# Patient Record
Sex: FEMALE | Race: WHITE | URBAN - METROPOLITAN AREA
[De-identification: names, ages, dates, MRNs, and addresses within clinical notes are randomized per-mention and may not be internally consistent; named-entity substitution may affect disease eponyms.]

---

## 2017-09-06 ENCOUNTER — THERAPY VISIT (OUTPATIENT)
Dept: PHYSICAL THERAPY | Facility: CLINIC | Age: 77
End: 2017-09-06
Payer: MEDICARE

## 2017-09-06 DIAGNOSIS — M25.532 LEFT WRIST PAIN: Primary | ICD-10-CM

## 2017-09-06 DIAGNOSIS — S62.309A CLOSED FRACTURE OF METACARPAL BONE: ICD-10-CM

## 2017-09-06 DIAGNOSIS — S52.122A LEFT RADIAL HEAD FRACTURE: ICD-10-CM

## 2017-09-06 PROCEDURE — G8984 CARRY CURRENT STATUS: HCPCS | Mod: GP | Performed by: PHYSICAL THERAPIST

## 2017-09-06 PROCEDURE — 97161 PT EVAL LOW COMPLEX 20 MIN: CPT | Mod: GP | Performed by: PHYSICAL THERAPIST

## 2017-09-06 PROCEDURE — G8985 CARRY GOAL STATUS: HCPCS | Mod: GP | Performed by: PHYSICAL THERAPIST

## 2017-09-06 PROCEDURE — 97110 THERAPEUTIC EXERCISES: CPT | Mod: GP | Performed by: PHYSICAL THERAPIST

## 2017-09-06 NOTE — PROGRESS NOTES
[facility] Initial Evaluation      Subjective:    Patient is a 77 year old female presenting with rehab left wrist hpi. The history is provided by the patient.   Consuelo Irving is a 77 year old female with a left wrist (elbow) condition.  Condition occurred with:  A fall.  Condition occurred: in the community.  This is a new condition  Reports tripping on sidewalk, falling face forward landing on her L side on 7/27/17. She fractured her L 5th metacarpal and L radial head and was casted for 4 weeks in a sling. The cast was removed on 9/5/17 and pt noted limited ROM/soreness in wrist and hand.MD x-rayed elbow/wrist/hand and it showed healing.MD advised PT on 9/5/17 .    Patient reports pain:  Other (wrist/hand).    Pain is described as aching and other (stiffness) and is intermittent and reported as 5/10.  Associated symptoms:  Loss of motion/stiffness and loss of strength. Pain is the same all the time.  Symptoms are exacerbated by activity/movement, carrying and lifting and relieved by rest.  Since onset symptoms are unchanged.  Special tests:  X-ray and other (showed healing at fx sites).      General health as reported by patient is good.  Pertinent medical history includes:  None.  Medical allergies: yes (marilou).  Other surgeries include:  None reported.  Current medications:  Other (cholesterol).  Current occupation is retired.            Red flags:  None as reported by the patient.                        Objective:          Flexibility/Screens:     Upper Extremity:    Decreased left upper extremity flexibility at:  Wrist Flexors and Wrist Extensors                                  ROM:  AROM:    Hyperextension Elbow: Left:  0  Right:  4  Flexion Elbow:  Left:100   Right:145  Extension Elbow:  Left: 35 from neutral    Right: 0  Flexion Wrist:  Left: 28    Right: 60  Extension Wrist:  Left: 2    Right:  60  Supination Elbow/Wrist:  Left: 45Right: 85  Pronation Elbow/Wrist:  Left: 40    Right:  85  Radial Deviation:  Left: 4   Right: 28  Ulnar Deviation:  Right: 40    Left: 10  PROM:      Flexion Elbow:  Left: 105  Extension Elbow: Left: 30 from neutral position  Flexion Wrist:  Left: 30      Extension Wrist:  Left: 2  Supination Elbow/Wrist: Left: 45        Pronation Elbow/Wrist: Left: 40          Pain: discomfort at lateral/dorsal aspect of wrist with all A-PROM    Strength:  not assessed                        Palpation:    Left wrist/elbow tenderness present at: Proximal Carpals and Distal Carpals    Mobility:  not assessed                                          General     ROS    Assessment/Plan:      Patient is a 77 year old female with left side elbow and left side wrist/hand complaints.    Patient has the following significant findings with corresponding treatment plan.                Diagnosis 1:  5th metacarpal fracture, radial head fx, wrist pain  Pain -  hot/cold therapy, manual therapy, self management, education and home program  Decreased ROM/flexibility - manual therapy, therapeutic exercise and home program  Decreased joint mobility - manual therapy, therapeutic exercise and home program  Decreased strength - therapeutic exercise, therapeutic activities and home program  Edema - cold therapy and self management/home program  Impaired muscle performance - neuro re-education and home program  Decreased function - therapeutic activities and home program    Therapy Evaluation Codes:   1) History comprised of:   Personal factors that impact the plan of care:      None.    Comorbidity factors that impact the plan of care are:      None.     Medications impacting care: None.  2) Examination of Body Systems comprised of:   Body structures and functions that impact the plan of care:      Elbow, Hand and Wrist.   Activity limitations that impact the plan of care are:      Bathing, Cooking, Driving, Dressing, Grasping and Lifting.  3) Clinical presentation characteristics  are:   Stable/Uncomplicated.  4) Decision-Making    Low complexity using standardized patient assessment instrument and/or measureable assessment of functional outcome.  Cumulative Therapy Evaluation is: Low complexity.    Previous and current functional limitations:  (See Goal Flow Sheet for this information)    Short term and Long term goals: (See Goal Flow Sheet for this information)     Communication ability:  Patient appears to be able to clearly communicate and understand verbal and written communication and follow directions correctly.  Treatment Explanation - The following has been discussed with the patient:   RX ordered/plan of care  Anticipated outcomes  Possible risks and side effects  This patient would benefit from PT intervention to resume normal activities.   Rehab potential is good.    Frequency:  1 X week, once daily  Duration:  for 8 weeks  Discharge Plan:  Achieve all LTG.  Independent in home treatment program.  Reach maximal therapeutic benefit.    Please refer to the daily flowsheet for treatment today, total treatment time and time spent performing 1:1 timed codes.

## 2017-09-06 NOTE — LETTER
DEPARTMENT OF HEALTH AND HUMAN SERVICES  CENTERS FOR MEDICARE & MEDICAID SERVICES    PLAN/UPDATED PLAN OF PROGRESS FOR OUTPATIENT REHABILITATION    PATIENTS NAME:  Consuelo Irving   : 1940  PR[facility] NUMBER:    [id number]  [id number]   PROVIDER NAME: [facility]  MEDICAL RECORD NUMBER: [id number]   START OF CARE DATE:  SOC Date: 17   TYPE:  PT    PRIMARY/TREATMENT DIAGNOSIS: (Pertinent Medical Diagnosis)     Left wrist pain  Left radial head fracture  Closed fracture of metacarpal bone    VISITS FROM START OF CARE:  Rxs Used: 1   [facility] Initial Evaluation  Subjective:  Patient is a 77 year old female presenting with rehab left wrist hpi. The history is provided by the patient.   Consuelo Irving is a 77 year old female with a left wrist (elbow) condition.  Condition occurred with:  A fall.  Condition occurred: in the community.  This is a new condition  Reports tripping on sidewalk, falling face forward landing on her L side on 17. She fractured her L 5th metacarpal and L radial head and was casted for 4 weeks in a sling. The cast was removed on 17 and pt noted limited ROM/soreness in wrist and hand.MD x-rayed elbow/wrist/hand and it showed healing.MD advised PT on 17 .    Patient reports pain:  Other (wrist/hand).    Pain is described as aching and other (stiffness) and is intermittent and reported as 5/10.  Associated symptoms:  Loss of motion/stiffness and loss of strength. Pain is the same all the time.  Symptoms are exacerbated by activity/movement, carrying and lifting and relieved by rest.  Since onset symptoms are unchanged.  Special tests:  X-ray and other (showed healing at fx sites).    General health as reported by patient is good.  Pertinent medical history includes:  None.  Medical allergies: yes (marilou).  Other surgeries include:  None reported.  Current medications:  Other (cholesterol).  Current  occupation is retired.      Red flags:  None as reported by the patient.       Objective:  Flexibility/Screens:   Upper Extremity:    Decreased left upper extremity flexibility at:  Wrist Flexors and Wrist Extensors  ROM:  AROM:    Hyperextension Elbow: Left:  0  Right:  4  Flexion Elbow:  Left:100   Right:145  Extension Elbow:  Left: 35 from neutral    Right: 0  Flexion Wrist:  Left: 28    Right: 60  Extension Wrist:  Left: 2    Right:  60  Supination Elbow/Wrist:  Left: 45Right: 85  Pronation Elbow/Wrist:  Left: 40    Right: 85  Radial Deviation:  Left: 4   Right: 28  Ulnar Deviation:  Right: 40    Left: 10  PROM:    Flexion Elbow:  Left: 105  Extension Elbow: Left: 30 from neutral position  Flexion Wrist:  Left: 30      Extension Wrist:  Left: 2  Supination Elbow/Wrist: Left: 45        Pronation Elbow/Wrist: Left: 40      Pain: discomfort at lateral/dorsal aspect of wrist with all A-PROM  Strength:  not assessed  Palpation:    Left wrist/elbow tenderness present at: Proximal Carpals and Distal Carpals  Mobility:  not assessed  PATIENTS NAME:  Consuelo Irving   : 1940    Assessment/Plan:    Patient is a 77 year old female with left side elbow and left side wrist/hand complaints.    Patient has the following significant findings with corresponding treatment plan.                Diagnosis 1:  5th metacarpal fracture, radial head fx, wrist pain  Pain -  hot/cold therapy, manual therapy, self management, education and home program  Decreased ROM/flexibility - manual therapy, therapeutic exercise and home program  Decreased joint mobility - manual therapy, therapeutic exercise and home program  Decreased strength - therapeutic exercise, therapeutic activities and home program  Edema - cold therapy and self management/home program  Impaired muscle performance - neuro re-education and home program  Decreased function - therapeutic activities and home program    Therapy Evaluation Codes:   1) History comprised  "of:   Personal factors that impact the plan of care:      None.    Comorbidity factors that impact the plan of care are:      None.     Medications impacting care: None.  2) Examination of Body Systems comprised of:   Body structures and functions that impact the plan of care:      Elbow, Hand and Wrist.   Activity limitations that impact the plan of care are:      Bathing, Cooking, Driving, Dressing, Grasping and Lifting.  3) Clinical presentation characteristics are:   Stable/Uncomplicated.  4) Decision-Making    Low complexity using standardized patient assessment instrument and/or measureable assessment of functional outcome.  Cumulative Therapy Evaluation is: Low complexity.    Previous and current functional limitations:  (See Goal Flow Sheet for this information)    Short term and Long term goals: (See Goal Flow Sheet for this information)     Communication ability:  Patient appears to be able to clearly communicate and understand verbal and written communication and follow directions correctly.  Treatment Explanation - The following has been discussed with the patient:     RX ordered/plan of care  Anticipated outcomes  Possible risks and side effects  This patient would benefit from PT intervention to resume normal activities.   Rehab potential is good.    Frequency:  1 X week, once daily  Duration:  for 8 weeks  Discharge Plan:  Achieve all LTG.  Independent in home treatment program.  Reach maximal therapeutic benefit.      Caregiver Signature/Credentials _____________________________ Date ________      Treating Provider: Marquita Zepeda PT   I have reviewed and certified the need for these services and plan of treatment while under my care.      PHYSICIAN'S SIGNATURE:   _________________________________________  Date___________     Anderson Morales  Certification period:  Beginning of Cert date period: 09/06/17 to  End of Cert period date: 12/04/17     Functional Level Progress Report: Please see attached \"Goal " "Flow sheet for Functional level.\"    ____X____ Continue Services or       ________ DC Services            Service dates: From  SOC Date: 09/06/17 date to present                         "

## 2017-09-06 NOTE — MR AVS SNAPSHOT
After Visit Summary   9/6/2017    Consuelo Irving    MRN: [id number]           Patient Information     Date Of Birth          1940        Visit Information        Provider Department      9/6/2017 12:40 PM Marquita Zepeda, PT [facility] Physical Therapy        Today's Diagnoses     Left wrist pain    -  1    Left radial head fracture        Closed fracture of metacarpal bone           Follow-ups after your visit        Your next 10 appointments already scheduled     Sep 08, 2017 10:50 AM CDT   MILTON Extremity with Marquita Zepeda PT   [facility] Physical Therapy ([facility])    [address]  [facility] [address]   [phone]            Sep 13, 2017 10:10 AM CDT   MILTON Extremity with Marquita Zepeda PT   [facility] ([facility])    [address]  [facility] [id number] [phone]              Who to contact     If you have questions or need follow up information about today's clinic visit or your schedule please contact [facility] PHYSICAL THERAPY directly at [phone].  Normal or non-critical lab and imaging results will be communicated to you by MyChart, letter or phone within 4 business days after the clinic has received the results. If you do not hear from us within 7 days, please contact the clinic through MyChart or phone. If you have a critical or abnormal lab result, we will notify you by phone as soon as possible.  Submit refill requests through [employer] or call your pharmacy and they will forward the refill request to us. Please allow 3 business days for your refill to be completed.          Additional Information About Your Visit        Source[employer] Information     [employer] lets you send messages to your doctor, view your test results, renew your prescriptions, schedule appointments and more. To sign up,  "go to [email or web address] . Click on \"Log in\" on the left side of the screen, which will take you to the Welcome page. Then click on \"Sign up Now\" on the right side of the page.     You will be asked to enter the access code listed below, as well as some personal information. Please follow the directions to create your username and password.     Your access code is: [email or web address]  Expires: 2017  5:23 PM     Your access code will  in 90 days. If you need help or a new code, please call your [place] clinic or [phone].        Care EveryWhere ID     This is your Care EveryWhere ID. This could be used by other organizations to access your [place] medical records  [id number]         Blood Pressure from Last 3 Encounters:   No data found for BP    Weight from Last 3 Encounters:   No data found for Wt              We Performed the Following     MILTON CERT REPORT     MIL[facility] Inital Eval Report     PT Eval, Low Complexity ([id number])     Therapeutic Exercises        Primary Care Provider    None Specified       No primary provider on file.        Equal Access to Services     [facility]: Hadii dvaid colin hadmartha Somay, waaxda luqadaha, qaybta kaalmaalvaro murry, salma leo . So [facility] [phone].    ATENCIÓN: Si habla español, tiene a hart disposición servicios gratuitos de asistencia lingüística. Brando al [phone].    We comply with applicable federal civil rights laws and [place] laws. We do not discriminate on the basis of race, color, national origin, age, disability sex, sexual orientation or gender identity.            Thank you!     Thank you for choosing [facility] PHYSICAL THERAPY  for your care. Our goal is always to provide you with excellent care. Hearing back from our patients is one way we can continue to improve our services. Please take a few minutes to complete the written survey that you may receive in the mail after your " visit with us. Thank you!             Your Updated Medication List - Protect others around you: Learn how to safely use, store and throw away your medicines at www.disposemymeds.org.      Notice  As of 9/6/2017  5:23 PM    You have not been prescribed any medications.

## 2017-09-08 ENCOUNTER — THERAPY VISIT (OUTPATIENT)
Dept: PHYSICAL THERAPY | Facility: CLINIC | Age: 77
End: 2017-09-08
Payer: MEDICARE

## 2017-09-08 DIAGNOSIS — S62.309A CLOSED FRACTURE OF METACARPAL BONE: ICD-10-CM

## 2017-09-08 DIAGNOSIS — M25.532 LEFT WRIST PAIN: ICD-10-CM

## 2017-09-08 DIAGNOSIS — S52.122A LEFT RADIAL HEAD FRACTURE: ICD-10-CM

## 2017-09-08 PROCEDURE — 97110 THERAPEUTIC EXERCISES: CPT | Mod: GP | Performed by: PHYSICAL THERAPIST

## 2017-09-08 PROCEDURE — 97140 MANUAL THERAPY 1/> REGIONS: CPT | Mod: GP | Performed by: PHYSICAL THERAPIST

## 2017-09-13 ENCOUNTER — THERAPY VISIT (OUTPATIENT)
Dept: PHYSICAL THERAPY | Facility: CLINIC | Age: 77
End: 2017-09-13
Payer: MEDICARE

## 2017-09-13 DIAGNOSIS — M25.532 LEFT WRIST PAIN: ICD-10-CM

## 2017-09-13 DIAGNOSIS — S52.122A LEFT RADIAL HEAD FRACTURE: ICD-10-CM

## 2017-09-13 DIAGNOSIS — S62.309A CLOSED FRACTURE OF METACARPAL BONE: ICD-10-CM

## 2017-09-13 PROCEDURE — 97110 THERAPEUTIC EXERCISES: CPT | Mod: GP | Performed by: PHYSICAL THERAPIST

## 2017-09-13 PROCEDURE — 97140 MANUAL THERAPY 1/> REGIONS: CPT | Mod: GP | Performed by: PHYSICAL THERAPIST

## 2017-09-13 NOTE — PROGRESS NOTES
Subjective:    HPI                    Objective:    System    Physical Exam    General     ROS    Assessment/Plan:      SUBJECTIVE  Subjective changes as noted by pt:  My wrist pain is feeling some better and the ROM improving. HEP is going ok. I do feel some discomfort in the elbow with the exercises.     Current pain level:  Current Pain level: 5/10   Changes in function:  Yes (See Goal flowsheet attached for changes in current functional level)     Adverse reaction to treatment or activity:  None    OBJECTIVE  Changes in objective findings:  Yes, increased elbow AROM:   EXT 31  Sup 45 pron 45, wrist FLX 38   EXT 20        ASSESSMENT  Consuelo continues to require intervention to meet STG and LTG's: PT  Patient is progressing as expected.  Patient is becoming more independent in home exercise program  Response to therapy has shown an improvement in  pain level, ROM  and function  Progress made towards STG/LTG?  Yes (See Goal flowsheet attached for updates on achievement of STG and LTG)    PLAN  Current treatment program is being advanced to more complex exercises.  The following procedures have been added:  PROM/AAROM and AROM    PTA/ATC plan:  N/A    Please refer to the daily flowsheet for treatment today, total treatment time and time spent performing 1:1 timed codes.

## 2017-09-13 NOTE — MR AVS SNAPSHOT
After Visit Summary   9/13/2017    Consuelo Irving    MRN: [id number]           Patient Information     Date Of Birth          1940        Visit Information        Provider Department      9/13/2017 10:10 AM Marquita Zepeda PT [facility] Physical Therapy        Today's Diagnoses     Left wrist pain        Left radial head fracture        Closed fracture of metacarpal bone           Follow-ups after your visit        Your next 10 appointments already scheduled     Sep 15, 2017 10:10 AM CDT   MILTON Extremity with Marquita Zepeda PT   [facility] ([facility])    [address]  [facility] [address]   [phone]            Sep 20, 2017  2:40 PM CDT   MILTON Extremity with Marquita Zepeda PT   [facility] ([facility])    [address]  [facility] [address]   [phone]            Sep 22, 2017 10:10 AM CDT   MILTON Extremity with Marquita Zepeda PT   [facility] ([facility])    [address]  [facility] [address]   [phone]              Who to contact     If you have questions or need follow up information about today's clinic visit or your schedule please contact [facility] PHYSICAL THERAPY directly at [phone].  Normal or non-critical lab and imaging results will be communicated to you by MyChart, letter or phone within 4 business days after the clinic has received the results. If you do not hear from us within 7 days, please contact the clinic through MyChart or phone. If you have a critical or abnormal lab result, we will notify you by phone as soon as possible.  Submit refill requests through Less[employer] or call your pharmacy and they will forward the refill request to us. Please allow 3 business days for your refill to be  "completed.          Additional Information About Your Visit        Astech[employer]ogo[employer] Information     [facility] [employer] lets you send messages to your doctor, view your test results, renew your prescriptions, schedule appointments and more. To sign up, go to [email or web address] . Click on \"Log in\" on the left side of the screen, which will take you to the Welcome page. Then click on \"Sign up Now\" on the right side of the page.     You will be asked to enter the access code listed below, as well as some personal information. Please follow the directions to create your username and password.     Your access code is: [email or web address]  Expires: 2017  5:23 PM     Your access code will  in 90 days. If you need help or a new code, please call your [place] clinic or [phone].        Care EveryWhere ID     This is your Care EveryWhere ID. This could be used by other organizations to access your [place] medical records  [id number]         Blood Pressure from Last 3 Encounters:   No data found for BP    Weight from Last 3 Encounters:   No data found for Wt              We Performed the Following     Manual Ther Tech, 1+Regions, EA 15 min     Therapeutic Exercises        Primary Care Provider    None Specified       No primary provider on file.        Equal Access to Services     [facility] : Mike Mott, james taylor, aislinn murry, salma leo . So [facility] [phone].    ATENCIÓN: Si habla español, tiene a hart disposición servicios gratuitos de asistencia lingüística. Llame al [phone].    We comply with applicable federal civil rights laws and [place] laws. We do not discriminate on the basis of race, color, national origin, age, disability sex, sexual orientation or gender identity.            Thank you!     Thank you for choosing [facility] PHYSICAL THERAPY  for your care. Our goal is always to provide you with excellent " care. Hearing back from our patients is one way we can continue to improve our services. Please take a few minutes to complete the written survey that you may receive in the mail after your visit with us. Thank you!             Your Updated Medication List - Protect others around you: Learn how to safely use, store and throw away your medicines at www.disposemymeds.org.      Notice  As of 9/13/2017  1:01 PM    You have not been prescribed any medications.

## 2017-09-15 ENCOUNTER — THERAPY VISIT (OUTPATIENT)
Dept: PHYSICAL THERAPY | Facility: CLINIC | Age: 77
End: 2017-09-15
Payer: MEDICARE

## 2017-09-15 DIAGNOSIS — S52.122A LEFT RADIAL HEAD FRACTURE: ICD-10-CM

## 2017-09-15 DIAGNOSIS — S62.309A CLOSED FRACTURE OF METACARPAL BONE: ICD-10-CM

## 2017-09-15 DIAGNOSIS — M25.532 LEFT WRIST PAIN: ICD-10-CM

## 2017-09-15 PROCEDURE — 97140 MANUAL THERAPY 1/> REGIONS: CPT | Mod: GP | Performed by: PHYSICAL THERAPIST

## 2017-09-15 PROCEDURE — 97110 THERAPEUTIC EXERCISES: CPT | Mod: GP | Performed by: PHYSICAL THERAPIST

## 2017-09-20 ENCOUNTER — THERAPY VISIT (OUTPATIENT)
Dept: PHYSICAL THERAPY | Facility: CLINIC | Age: 77
End: 2017-09-20
Payer: MEDICARE

## 2017-09-20 DIAGNOSIS — S52.122A LEFT RADIAL HEAD FRACTURE: ICD-10-CM

## 2017-09-20 DIAGNOSIS — M25.532 LEFT WRIST PAIN: ICD-10-CM

## 2017-09-20 DIAGNOSIS — S62.309A CLOSED FRACTURE OF METACARPAL BONE: ICD-10-CM

## 2017-09-20 PROCEDURE — 97110 THERAPEUTIC EXERCISES: CPT | Mod: GP | Performed by: PHYSICAL THERAPIST

## 2017-09-20 PROCEDURE — 97140 MANUAL THERAPY 1/> REGIONS: CPT | Mod: GP | Performed by: PHYSICAL THERAPIST

## 2017-09-20 NOTE — PROGRESS NOTES
Subjective:    HPI                    Objective:    System    Physical Exam    General     ROS    Assessment/Plan:      PROGRESS  REPORT    Progress reporting period is from 9/6/17 to 9/20/17       SUBJECTIVE  Subjective changes noted by patient: I'm using the L arm a lot more with reaching and light lifting around the house. My  is slowly getting stronger and the ROM is improving in elbow and wrist. Exercises  are going well. I still have pain in the wrist with certain movements of the arm.     Current pain level is 3/10  .     Previous pain level was  5/10  .   Changes in function:  Yes (See Goal flowsheet attached for changes in current functional level)  Adverse reaction to treatment or activity: None    OBJECTIVE  Changes noted in objective findings:  Yes,increased elbow AROM and  PROM:  EXT 26   Pronation 50    Supination 48   wrist FLX 50   EXT 38   Instructed in AROM, AAROM and PROM stretching to elbow, forearm and wrist and  strengthening.    ASSESSMENT/PLAN  Updated problem list and treatment plan: Diagnosis 1: L wrist pain, S/P L radial head fracture and 5th metacarpal fracture   Pain -  hot/cold therapy, manual therapy, self management, education and home program  Decreased ROM/flexibility - manual therapy, therapeutic exercise and home program  Decreased joint mobility - manual therapy, therapeutic exercise and home program  Decreased strength - therapeutic exercise, therapeutic activities and home program  Impaired muscle performance - neuro re-education and home program  Decreased function - therapeutic activities and home program  STG/LTGs have been met or progress has been made towards goals:  Yes (See Goal flow sheet completed today.)  Assessment of Progress: The patient's condition is improving.  Patient is meeting short term goals and is progressing towards long term goals.  Self Management Plans:  Patient has been instructed in a home treatment program.  Patient  has been  instructed in self management of symptoms.  I have re-evaluated this patient and find that the nature, scope, duration and intensity of the therapy is appropriate for the medical condition of the patient.  Consuelo continues to require the following intervention to meet STG and LTG's:  PT    Recommendations:  This patient would benefit from continued therapy.     Frequency:  1 X week, once daily  Duration:  for 4 weeks          Please refer to the daily flowsheet for treatment today, total treatment time and time spent performing 1:1 timed codes.

## 2017-09-20 NOTE — MR AVS SNAPSHOT
After Visit Summary   9/20/2017    Consuelo Irving    MRN: [id number]           Patient Information     Date Of Birth          1940        Visit Information        Provider Department      9/20/2017 2:40 PM Marquita Zepeda, PT [facility] Physical Therapy        Today's Diagnoses     Left wrist pain        Left radial head fracture        Closed fracture of metacarpal bone           Follow-ups after your visit        Your next 10 appointments already scheduled     Sep 22, 2017 10:10 AM CDT   MILTON Extremity with Marquita Zepeda PT   [facility] Physical Therapy ([facility])    [address]  [facility] [address]   [phone]            Sep 29, 2017 10:10 AM CDT   MILTON Extremity with Marquita Zepeda PT   [facility] Physical Therapy ([facility])    [address]  [facility] [id number] [phone]              Who to contact     If you have questions or need follow up information about today's clinic visit or your schedule please contact [facility] PHYSICAL THERAPY directly at [phone].  Normal or non-critical lab and imaging results will be communicated to you by MyChart, letter or phone within 4 business days after the clinic has received the results. If you do not hear from us within 7 days, please contact the clinic through MyChart or phone. If you have a critical or abnormal lab result, we will notify you by phone as soon as possible.  Submit refill requests through Say2me or call your pharmacy and they will forward the refill request to us. Please allow 3 business days for your refill to be completed.          Additional Information About Your Visit        Baokimhart Information     Say2me lets you send messages to your doctor, view your test results, renew your prescriptions, schedule appointments and more. To sign up, go  "to [email or web address] . Click on \"Log in\" on the left side of the screen, which will take you to the Welcome page. Then click on \"Sign up Now\" on the right side of the page.     You will be asked to enter the access code listed below, as well as some personal information. Please follow the directions to create your username and password.     Your access code is: [email or web address]  Expires: 2017  5:23 PM     Your access code will  in 90 days. If you need help or a new code, please call your [place] clinic or [phone].        Care EveryWhere ID     This is your Care EveryWhere ID. This could be used by other organizations to access your [place] medical records  [id number]         Blood Pressure from Last 3 Encounters:   No data found for BP    Weight from Last 3 Encounters:   No data found for Wt              We Performed the Following     MILTON Progress Notes Report     Manual Ther Tech, 1+Regions, EA 15 min     Therapeutic Exercises        Primary Care Provider    None Specified       No primary provider on file.        Equal Access to Services     [facility]: Hadii david duenas Somay, waaxda luqadaha, qaybta kaalmaalvaro murry, salma leo . So [facility] [phone].    ATENCIÓN: Si habla español, tiene a hart disposición servicios gratuitos de asistencia lingüística. Llame al [phone].    We comply with applicable federal civil rights laws and [place] laws. We do not discriminate on the basis of race, color, national origin, age, disability sex, sexual orientation or gender identity.            Thank you!     Thank you for choosing [facility] PHYSICAL THERAPY  for your care. Our goal is always to provide you with excellent care. Hearing back from our patients is one way we can continue to improve our services. Please take a few minutes to complete the written survey that you may receive in the mail after your visit with us. " Thank you!             Your Updated Medication List - Protect others around you: Learn how to safely use, store and throw away your medicines at www.disposemymeds.org.      Notice  As of 9/20/2017  3:39 PM    You have not been prescribed any medications.

## 2017-09-20 NOTE — LETTER
[facility]THLETIC [facility] PHYSICAL [facility]  [address] [facility]  [facility] [id number] [phone]    2017    Re: Consuelo Irving   :   1940  MRN:  [id number]   REFERRING PHYSICIAN:   Anderson Morales    [facility]IC [facility]    Date of Initial Evaluation:    Visits:  Rxs Used: 5  Reason for Referral:     Left wrist pain  Left radial head fracture  Closed fracture of metacarpal bone    PROGRESS  REPORT    Progress reporting period is from 17 to 17       SUBJECTIVE  Subjective changes noted by patient: I'm using the L arm a lot more with reaching and light lifting around the house. My  is slowly getting stronger and the ROM is improving in elbow and wrist. Exercises  are going well. I still have pain in the wrist with certain movements of the arm.     Current pain level is 3/10  .     Previous pain level was  5/10  .   Changes in function:  Yes (See Goal flowsheet attached for changes in current functional level)  Adverse reaction to treatment or activity: None    OBJECTIVE  Changes noted in objective findings:  Yes,increased elbow AROM and  PROM:  EXT 26   Pronation 50    Supination 48   wrist FLX 50   EXT 38   Instructed in AROM, AAROM and PROM stretching to elbow, forearm and wrist and  strengthening.    ASSESSMENT/PLAN  Updated problem list and treatment plan: Diagnosis 1: L wrist pain, S/P L radial head fracture and 5th metacarpal fracture   Pain -  hot/cold therapy, manual therapy, self management, education and home program  Decreased ROM/flexibility - manual therapy, therapeutic exercise and home program  Decreased joint mobility - manual therapy, therapeutic exercise and home program  Decreased strength - therapeutic exercise, therapeutic activities and home program  Impaired muscle performance - neuro re-education and home program  Decreased function - therapeutic activities and home  program  STG/LTGs have been met or progress has been made towards goals:  Yes (See Goal flow sheet completed today.)  Assessment of Progress: The patient's condition is improving.  Patient is meeting short term goals and is progressing towards long term goals.  Self Management Plans:  Patient has been instructed in a home treatment program.  Patient  has been instructed in self management of symptoms.  I have re-evaluated this patient and find that the nature, scope, duration and intensity of the therapy is appropriate for the medical condition of the patient.  Consuelo continues to require the following intervention to meet STG and LTG's:  PT    Recommendations:  This patient would benefit from continued therapy.     Frequency:  1 X week, once daily  Duration:  for 4 weeks          Thank you for your referral.    INQUIRIES  Therapist: Marquita Zepeda, PT  [facility] PH[facility]ICAL THER[facility]  [address] [place] [address]. [address]  [facility] [address]  Phone: [phone]  Fax: [phone]

## 2017-09-22 ENCOUNTER — THERAPY VISIT (OUTPATIENT)
Dept: PHYSICAL THERAPY | Facility: CLINIC | Age: 77
End: 2017-09-22
Payer: MEDICARE

## 2017-09-22 DIAGNOSIS — S62.309A CLOSED FRACTURE OF METACARPAL BONE: ICD-10-CM

## 2017-09-22 DIAGNOSIS — S52.122A LEFT RADIAL HEAD FRACTURE: ICD-10-CM

## 2017-09-22 DIAGNOSIS — M25.532 LEFT WRIST PAIN: ICD-10-CM

## 2017-09-22 PROCEDURE — 97110 THERAPEUTIC EXERCISES: CPT | Mod: GP | Performed by: PHYSICAL THERAPIST

## 2017-09-22 PROCEDURE — 97140 MANUAL THERAPY 1/> REGIONS: CPT | Mod: GP | Performed by: PHYSICAL THERAPIST

## 2017-09-22 NOTE — MR AVS SNAPSHOT
After Visit Summary   9/22/2017    Consuelo Irving    MRN: [id number]           Patient Information     Date Of Birth          1940        Visit Information        Provider Department      9/22/2017 10:10 AM Marquita Zepeda, VEL[facility] Physical Therapy        Today's Diagnoses     Left wrist pain        Left radial head fracture        Closed fracture of metacarpal bone           Follow-ups after your visit        Your next 10 appointments already scheduled     Sep 29, 2017 10:10 AM CDT   MILTON Extremity with Marquita Zepeda PT   [facility] Physical Therapy ([facility])    [address]  [facility] [address]   [phone]            Oct 06, 2017 10:10 AM CDT   MILTON Extremity with Marquita Zepeda PT   [facility] Physical Therapy ([facility])    [address]  [facility] [id number] [phone]              Who to contact     If you have questions or need follow up information about today's clinic visit or your schedule please contact [facility] PHYSICAL THERAPY directly at [phone].  Normal or non-critical lab and imaging results will be communicated to you by MyChart, letter or phone within 4 business days after the clinic has received the results. If you do not hear from us within 7 days, please contact the clinic through MyChart or phone. If you have a critical or abnormal lab result, we will notify you by phone as soon as possible.  Submit refill requests through SportStream or call your pharmacy and they will forward the refill request to us. Please allow 3 business days for your refill to be completed.          Additional Information About Your Visit        NUVETAhart Information     SportStream lets you send messages to your doctor, view your test results, renew your prescriptions, schedule appointments and more. To sign up,  "go to [email or web address] . Click on \"Log in\" on the left side of the screen, which will take you to the Welcome page. Then click on \"Sign up Now\" on the right side of the page.     You will be asked to enter the access code listed below, as well as some personal information. Please follow the directions to create your username and password.     Your access code is: [email or web address]  Expires: 2017  5:23 PM     Your access code will  in 90 days. If you need help or a new code, please call your [place] clinic or [phone].        Care EveryWhere ID     This is your Care EveryWhere ID. This could be used by other organizations to access your [place] medical records  [id number]         Blood Pressure from Last 3 Encounters:   No data found for BP    Weight from Last 3 Encounters:   No data found for Wt              We Performed the Following     Manual Ther Tech, 1+Regions, EA 15 min     Therapeutic Exercises        Primary Care Provider    None Specified       No primary provider on file.        Equal Access to Services     [facility]: Hadii david duenas Somay, waaxda luqadaha, qaybta kaalmaalvaro murry, salma leo . So [facility] [phone].    ATENCIÓN: Si habla español, tiene a hart disposición servicios gratuitos de asistencia lingüística. Llame al [phone].    We comply with applicable federal civil rights laws and [place] laws. We do not discriminate on the basis of race, color, national origin, age, disability sex, sexual orientation or gender identity.            Thank you!     Thank you for choosing [facility] PHYSICAL THERAPY  for your care. Our goal is always to provide you with excellent care. Hearing back from our patients is one way we can continue to improve our services. Please take a few minutes to complete the written survey that you may receive in the mail after your visit with us. Thank you!             Your " Updated Medication List - Protect others around you: Learn how to safely use, store and throw away your medicines at www.disposemymeds.org.      Notice  As of 9/22/2017 11:37 AM    You have not been prescribed any medications.

## 2017-09-29 ENCOUNTER — THERAPY VISIT (OUTPATIENT)
Dept: PHYSICAL THERAPY | Facility: CLINIC | Age: 77
End: 2017-09-29
Payer: MEDICARE

## 2017-09-29 DIAGNOSIS — S62.309A CLOSED FRACTURE OF METACARPAL BONE: ICD-10-CM

## 2017-09-29 DIAGNOSIS — S52.122A LEFT RADIAL HEAD FRACTURE: ICD-10-CM

## 2017-09-29 DIAGNOSIS — M25.532 LEFT WRIST PAIN: ICD-10-CM

## 2017-09-29 PROCEDURE — 97110 THERAPEUTIC EXERCISES: CPT | Mod: GP | Performed by: PHYSICAL THERAPIST

## 2017-09-29 PROCEDURE — 97140 MANUAL THERAPY 1/> REGIONS: CPT | Mod: GP | Performed by: PHYSICAL THERAPIST

## 2017-09-29 NOTE — PROGRESS NOTES
Subjective:    HPI                    Objective:    System    Physical Exam    General     ROS    Assessment/Plan:      SUBJECTIVE  Subjective changes as noted by pt:  My wrist and elbow ROM is improving and less pain with household chores.     Current pain level: 1/10     Changes in function:  Yes (See Goal flowsheet attached for changes in current functional level)     Adverse reaction to treatment or activity:  None    OBJECTIVE  Changes in objective findings:  Yes,increased wrist PROM: FLX 60  EXT 45, Sup 55   pron 58,  Elbow    EXT 19  Progressed with strengthening for elbow FLX, wrist FLX/EXT and pron/sup.        ASSESSMENT  Consuelo continues to require intervention to meet STG and LTG's: PT  Patient's symptoms are resolving.  Patient is progressing as expected.  Patient is becoming more independent in home exercise program  Response to therapy has shown an improvement in  pain level, ROM , strength, muscle control and function  Progress made towards STG/LTG?  Yes (See Goal flowsheet attached for updates on achievement of STG and LTG)    PLAN  Current treatment program is being advanced to more complex exercises.  The following procedures have been added:  strengthening    PTA/ATC plan:  N/A    Please refer to the daily flowsheet for treatment today, total treatment time and time spent performing 1:1 timed codes.

## 2017-09-29 NOTE — MR AVS SNAPSHOT
"              After Visit Summary   9/29/2017    Consuelo Irving    MRN: [id number]           Patient Information     Date Of Birth          1940        Visit Information        Provider Department      9/29/2017 10:10 AM Marquita Zepeda PT [facility] Physical Therapy        Today's Diagnoses     Left wrist pain        Left radial head fracture        Closed fracture of metacarpal bone           Follow-ups after your visit        Your next 10 appointments already scheduled     Oct 06, 2017 10:10 AM CDT   [facility] Extremity with Marquita Zepeda PT   [facility] Physical Therapy ([facility])    [address]  [facility] [address] [phone]              Who to contact     If you have questions or need follow up information about today's clinic visit or your schedule please contact [facility] ATHLETIC [facility] PHYSICAL [facility] directly at [phone].  Normal or non-critical lab and imaging results will be communicated to you by CEINThart, letter or phone within 4 business days after the clinic has received the results. If you do not hear from us within 7 days, please contact the clinic through CEINThart or phone. If you have a critical or abnormal lab result, we will notify you by phone as soon as possible.  Submit refill requests through Classting or call your pharmacy and they will forward the refill request to us. Please allow 3 business days for your refill to be completed.          Additional Information About Your Visit        CEINTharEnervee Information     Classting lets you send messages to your doctor, view your test results, renew your prescriptions, schedule appointments and more. To sign up, go to [email or web address] . Click on \"Log in\" on the left side of the screen, which will take you to the Welcome page. Then click on \"Sign up Now\" on the right side of the page.     You will be asked to enter the access code " listed below, as well as some personal information. Please follow the directions to create your username and password.     Your access code is: [email or web address]  Expires: 2017  5:23 PM     Your access code will  in 90 days. If you need help or a new code, please call your [place] clinic or [phone].        Care EveryWhere ID     This is your Care EveryWhere ID. This could be used by other organizations to access your [place] medical records  [id number]         Blood Pressure from Last 3 Encounters:   No data found for BP    Weight from Last 3 Encounters:   No data found for Wt              We Performed the Following     Manual Ther Tech, 1+Regions, EA 15 min     Therapeutic Exercises        Primary Care Provider    None Specified       No primary provider on file.        Equal Access to Services     [facility] : Mike Mott, wabernardino gutierresqjayy, qaruthta kaalmaalvaro murry, salma leo . So [facility] [phone].    ATENCIÓN: Si habla español, tiene a hart disposición servicios gratuitos de asistencia lingüística. Llame al [phone].    We comply with applicable federal civil rights laws and [place] laws. We do not discriminate on the basis of race, color, national origin, age, disability sex, sexual orientation or gender identity.            Thank you!     Thank you for choosing [facility] PHYSICAL THERAPY  for your care. Our goal is always to provide you with excellent care. Hearing back from our patients is one way we can continue to improve our services. Please take a few minutes to complete the written survey that you may receive in the mail after your visit with us. Thank you!             Your Updated Medication List - Protect others around you: Learn how to safely use, store and throw away your medicines at www.disposemymeds.org.      Notice  As of 2017 11:36 AM    You have not been prescribed any medications.

## 2017-10-06 ENCOUNTER — THERAPY VISIT (OUTPATIENT)
Dept: PHYSICAL THERAPY | Facility: CLINIC | Age: 77
End: 2017-10-06
Payer: MEDICARE

## 2017-10-06 DIAGNOSIS — M25.532 LEFT WRIST PAIN: ICD-10-CM

## 2017-10-06 DIAGNOSIS — S62.309A CLOSED FRACTURE OF METACARPAL BONE: ICD-10-CM

## 2017-10-06 DIAGNOSIS — S52.122A LEFT RADIAL HEAD FRACTURE: ICD-10-CM

## 2017-10-06 PROCEDURE — 97140 MANUAL THERAPY 1/> REGIONS: CPT | Mod: GP | Performed by: PHYSICAL THERAPIST

## 2017-10-06 PROCEDURE — 97110 THERAPEUTIC EXERCISES: CPT | Mod: GP | Performed by: PHYSICAL THERAPIST

## 2017-10-06 NOTE — PROGRESS NOTES
Subjective:    HPI                    Objective:    System    Physical Exam    General     ROS    Assessment/Plan:      SUBJECTIVE  Subjective changes as noted by pt:  I'm making steady improvement with the wrist and elbow motion and doing household chores a lot easier. I bowled last week pain free. HEP is going well.    Current pain level: 0/10     Changes in function:  Yes (See Goal flowsheet attached for changes in current functional level)     Adverse reaction to treatment or activity:  None    OBJECTIVE  Changes in objective findings:  Yes, increased elbow PROM: EXT15    Wrist FLX 62 EXT 50  pron 70  Sup 58       Instructed in wall push up and increased reps with wrist and elbow strengthening.     ASSESSMENT  Consuelo continues to require intervention to meet STG and LTG's: PT  Patient's symptoms are resolving.  Patient is progressing as expected.  Patient is becoming more independent in home exercise program  Response to therapy has shown an improvement in  pain level, ROM , strength, muscle control and function  Progress made towards STG/LTG?  Yes (See Goal flowsheet attached for updates on achievement of STG and LTG)    PLAN  Current treatment program is being advanced to more complex exercises.  The following procedures have been added:  strengthening    PTA/ATC plan:  N/A    Please refer to the daily flowsheet for treatment today, total treatment time and time spent performing 1:1 timed codes.            DISCHARGE REPORT    Progress reporting period is from 9/6/17  to 10/6/17.     SUBJECTIVE                   ;   ,     Patient has failed to return to therapy so current objective findings are unknown.  The subjective and objective information are from the last SOAP note on this patient.    OBJECTIVE         ASSESSMENT/PLAN  Updated problem list and treatment plan: Diagnosis 1: L wrist pain, radial head fracture, 5th metacarpal fracture    STG/LTGs have been met or progress has been made towards goals:   Yes (See Goal flow sheet completed today.)  Assessment of Progress: The patient has not returned to therapy. Current status is unknown.  Self Management Plans:  Patient has been instructed in a home treatment program.  Patient  has been instructed in self management of symptoms.    Consuelo continues to require the following intervention to meet STG and LTG's: PT  The patient failed to complete scheduled/ordered appointments so current information is unknown.  We will discharge this patient from PT.  Patient cancelled final visit since she was doing well.  Recommendations:  DC    Please refer to the daily flowsheet for treatment today, total treatment time and time spent performing 1:1 timed codes.

## 2017-10-06 NOTE — MR AVS SNAPSHOT
"              After Visit Summary   10/6/2017    Consuelo Irving    MRN: [id number]           Patient Information     Date Of Birth          1940        Visit Information        Provider Department      10/6/2017 10:10 AM Marquita Zepeda PT [facility] Physical Therapy        Today's Diagnoses     Left wrist pain        Left radial head fracture        Closed fracture of metacarpal bone           Follow-ups after your visit        Your next 10 appointments already scheduled     Oct 27, 2017 10:10 AM CDT   MILTON Extremity with Marquita Zepeda PT   [facility] Physical Therapy ([facility])    [address]  [facility] [id number] [phone]              Who to contact     If you have questions or need follow up information about today's clinic visit or your schedule please contact [facility] ATHLETIC [facility] PHYSICAL [facility] directly at [phone].  Normal or non-critical lab and imaging results will be communicated to you by Intuitive User Interfaceshart, letter or phone within 4 business days after the clinic has received the results. If you do not hear from us within 7 days, please contact the clinic through Intuitive User Interfaceshart or phone. If you have a critical or abnormal lab result, we will notify you by phone as soon as possible.  Submit refill requests through Viibar or call your pharmacy and they will forward the refill request to us. Please allow 3 business days for your refill to be completed.          Additional Information About Your Visit        Intuitive User InterfacesharBrainwave Education Information     Viibar lets you send messages to your doctor, view your test results, renew your prescriptions, schedule appointments and more. To sign up, go to [email or web address] . Click on \"Log in\" on the left side of the screen, which will take you to the Welcome page. Then click on \"Sign up Now\" on the right side of the page.     You will be asked to enter the access code " listed below, as well as some personal information. Please follow the directions to create your username and password.     Your access code is: [email or web address]  Expires: 2017  5:23 PM     Your access code will  in 90 days. If you need help or a new code, please call your [place] clinic or [phone].        Care EveryWhere ID     This is your Care EveryWhere ID. This could be used by other organizations to access your [place] medical records  [id number]         Blood Pressure from Last 3 Encounters:   No data found for BP    Weight from Last 3 Encounters:   No data found for Wt              We Performed the Following     Manual Ther Tech, 1+Regions, EA 15 min     Therapeutic Exercises        Primary Care Provider    None Specified       No primary provider on file.        Equal Access to Services     [facility] : Mike Mott, wabernardino taylor, qaruthta kaalmaalvaro murry, salma leo . So [facility] [place] [phone].    ATENCIÓN: Si habla español, tiene a hart disposición servicios gratuitos de asistencia lingüística. Llame al [phone].    We comply with applicable federal civil rights laws and [place] laws. We do not discriminate on the basis of race, color, national origin, age, disability, sex, sexual orientation, or gender identity.            Thank you!     Thank you for choosing [facility] PHYSICAL THERAPY  for your care. Our goal is always to provide you with excellent care. Hearing back from our patients is one way we can continue to improve our services. Please take a few minutes to complete the written survey that you may receive in the mail after your visit with us. Thank you!             Your Updated Medication List - Protect others around you: Learn how to safely use, store and throw away your medicines at www.disposemymeds.org.      Notice  As of 10/6/2017 10:51 AM    You have not been prescribed any medications.

## 2017-11-21 PROBLEM — M25.532 LEFT WRIST PAIN: Status: RESOLVED | Noted: 2017-09-06 | Resolved: 2017-11-21

## 2017-11-21 PROBLEM — S52.122A LEFT RADIAL HEAD FRACTURE: Status: RESOLVED | Noted: 2017-09-06 | Resolved: 2017-11-21

## 2017-11-21 PROBLEM — S62.309A CLOSED FRACTURE OF METACARPAL BONE: Status: RESOLVED | Noted: 2017-09-06 | Resolved: 2017-11-21
